# Patient Record
Sex: FEMALE | Race: BLACK OR AFRICAN AMERICAN
[De-identification: names, ages, dates, MRNs, and addresses within clinical notes are randomized per-mention and may not be internally consistent; named-entity substitution may affect disease eponyms.]

---

## 2021-04-10 ENCOUNTER — HOSPITAL ENCOUNTER (EMERGENCY)
Dept: HOSPITAL 35 - ER | Age: 41
Discharge: HOME | End: 2021-04-10
Payer: COMMERCIAL

## 2021-04-10 VITALS — DIASTOLIC BLOOD PRESSURE: 78 MMHG | SYSTOLIC BLOOD PRESSURE: 131 MMHG

## 2021-04-10 VITALS — HEIGHT: 68 IN | WEIGHT: 293 LBS | BODY MASS INDEX: 44.41 KG/M2

## 2021-04-10 DIAGNOSIS — R07.9: Primary | ICD-10-CM

## 2021-04-10 DIAGNOSIS — R42: ICD-10-CM

## 2021-04-10 DIAGNOSIS — Z79.899: ICD-10-CM

## 2021-04-10 LAB
ALBUMIN SERPL-MCNC: 3.3 G/DL (ref 3.4–5)
ALT SERPL-CCNC: 21 U/L (ref 14–59)
ANION GAP SERPL CALC-SCNC: 7 MMOL/L (ref 7–16)
AST SERPL-CCNC: 13 U/L (ref 15–37)
BASOPHILS NFR BLD AUTO: 0.7 % (ref 0–2)
BILIRUB SERPL-MCNC: 0.2 MG/DL (ref 0.2–1)
BUN SERPL-MCNC: 11 MG/DL (ref 7–18)
CALCIUM SERPL-MCNC: 8.8 MG/DL (ref 8.5–10.1)
CHLORIDE SERPL-SCNC: 104 MMOL/L (ref 98–107)
CO2 SERPL-SCNC: 28 MMOL/L (ref 21–32)
CREAT SERPL-MCNC: 1.1 MG/DL (ref 0.6–1)
EOSINOPHIL NFR BLD: 1.6 % (ref 0–3)
ERYTHROCYTE [DISTWIDTH] IN BLOOD BY AUTOMATED COUNT: 13.1 % (ref 10.5–14.5)
GLUCOSE SERPL-MCNC: 113 MG/DL (ref 74–106)
GRANULOCYTES NFR BLD MANUAL: 54.3 % (ref 36–66)
HCT VFR BLD CALC: 34.2 % (ref 37–47)
HGB BLD-MCNC: 11.4 GM/DL (ref 12–15)
LYMPHOCYTES NFR BLD AUTO: 33.2 % (ref 24–44)
MCH RBC QN AUTO: 27.2 PG (ref 26–34)
MCHC RBC AUTO-ENTMCNC: 33.3 G/DL (ref 28–37)
MCV RBC: 81.7 FL (ref 80–100)
MONOCYTES NFR BLD: 10.2 % (ref 1–8)
NEUTROPHILS # BLD: 2.3 THOU/UL (ref 1.4–8.2)
PLATELET # BLD: 228 THOU/UL (ref 150–400)
POTASSIUM SERPL-SCNC: 3.3 MMOL/L (ref 3.5–5.1)
PROT SERPL-MCNC: 7.9 G/DL (ref 6.4–8.2)
RBC # BLD AUTO: 4.19 MIL/UL (ref 4.2–5)
SODIUM SERPL-SCNC: 139 MMOL/L (ref 136–145)
TROPONIN I SERPL-MCNC: <0.06 NG/ML (ref ?–0.06)
WBC # BLD AUTO: 4.2 THOU/UL (ref 4–11)

## 2021-04-11 NOTE — EKG
[place] [address] [facility]
[place]  [address]
Phone:  [phone]                    ELECTROCARDIOGRAM REPORT      
_______________________________________________________________________________
 
Name:       TIFFANI NEGRETE                   Room #:                     DEP PRICILA LAGUNA#:      [id number]     Account #:      [id number]  
Admission:  04/10/21    Attend Phys:                          
Discharge:  04/10/21    Date of Birth:  [birth year]  
                                                          Report #: [id number]
   [id number]
_______________________________________________________________________________
                         [facility] ED
                                       
Test Date:    2021-04-10               Test Time:    14:04:05
Pat Name:     TIFFANI NEGRETE              Department:   
Patient ID:   [id number]            Room:          
Gender:       F                        Technician:   
:          1980               Requested By: Luanne Avilez
Order Number: [id number] MD:   Jorden Schmitz
                                 Measurements
Intervals                              Axis          
Rate:         65                       P:            65
NE:           147                      QRS:          12
QRSD:         82                       T:            -1
QT:           413                                    
QTc:          430                                    
                           Interpretive Statements
Sinus rhythm
Borderline T abnormalities, inferior leads
No previous ECG available for comparison
Electronically Signed On 2021 11:05:07 CDT by Jorden Schmitz
[email or web address]
 
 
 
 
 
 
 
 
 
 
 
 
 
 
 
 
 
 
 
 
 
 
  <ELECTRONICALLY SIGNED>
   By: Jorden Schmitz MD        
  21     1105
D: 04/10/21 1404                           _____________________________________
T: 04/10/21 1404                           Jorden Schmitz MD          /EPI